# Patient Record
Sex: MALE | Race: WHITE | NOT HISPANIC OR LATINO | Employment: FULL TIME | URBAN - METROPOLITAN AREA
[De-identification: names, ages, dates, MRNs, and addresses within clinical notes are randomized per-mention and may not be internally consistent; named-entity substitution may affect disease eponyms.]

---

## 2017-04-11 ENCOUNTER — OFFICE VISIT (OUTPATIENT)
Dept: PODIATRY | Facility: CLINIC | Age: 32
End: 2017-04-11
Payer: COMMERCIAL

## 2017-04-11 VITALS
DIASTOLIC BLOOD PRESSURE: 89 MMHG | SYSTOLIC BLOOD PRESSURE: 140 MMHG | WEIGHT: 260 LBS | BODY MASS INDEX: 35.21 KG/M2 | HEIGHT: 72 IN | HEART RATE: 79 BPM

## 2017-04-11 DIAGNOSIS — L08.9 FOREIGN BODY IN FOOT, RIGHT, INFECTED, INITIAL ENCOUNTER: Primary | ICD-10-CM

## 2017-04-11 DIAGNOSIS — S90.851A FOREIGN BODY IN FOOT, RIGHT, INFECTED, INITIAL ENCOUNTER: Primary | ICD-10-CM

## 2017-04-11 PROCEDURE — 87070 CULTURE OTHR SPECIMN AEROBIC: CPT

## 2017-04-11 PROCEDURE — 28190 REMOVAL OF FOOT FOREIGN BODY: CPT | Mod: S$GLB,,, | Performed by: PODIATRIST

## 2017-04-11 PROCEDURE — 87075 CULTR BACTERIA EXCEPT BLOOD: CPT

## 2017-04-11 PROCEDURE — 99999 PR PBB SHADOW E&M-NEW PATIENT-LVL III: CPT | Mod: PBBFAC,,, | Performed by: PODIATRIST

## 2017-04-11 PROCEDURE — 1160F RVW MEDS BY RX/DR IN RCRD: CPT | Mod: S$GLB,,, | Performed by: PODIATRIST

## 2017-04-11 PROCEDURE — 99203 OFFICE O/P NEW LOW 30 MIN: CPT | Mod: 25,S$GLB,, | Performed by: PODIATRIST

## 2017-04-11 RX ORDER — LISINOPRIL 10 MG/1
10 TABLET ORAL DAILY
COMMUNITY

## 2017-04-11 RX ORDER — AMOXICILLIN AND CLAVULANATE POTASSIUM 875; 125 MG/1; MG/1
1 TABLET, FILM COATED ORAL EVERY 12 HOURS
Qty: 20 TABLET | Refills: 0 | Status: SHIPPED | OUTPATIENT
Start: 2017-04-03 | End: 2017-04-13

## 2017-04-11 NOTE — PROGRESS NOTES
Subjective:      Patient ID: Ceasar Argueta is a 31 y.o. male.    Chief Complaint: Foot Injury (R foot last Monday)    Ceasar is a 31 y.o. male who presents to the clinic for evaluation and treatment of a wound secondary to stepping on mulch on April 3. Ceasar has a past medical history of Hyperlipidemia and Hypertension. The patient's chief complaint is foot ulcer, R foot. He came to my house the evening he did it and I was able to flush it with sterile saline and remove superficial dirt and dress with Iodosorb Gel and a Bandaid and called in Augmentin BID. It got much better, but last night after work, it started bothering him and draining again, so I had him come in today for a check.     PCP: Primary Doctor No    Date Last Seen by PCP: He hasn't seen him yet. His old one was Dr. Santo Painting but he is switching to a new one.      Current shoe gear:  Affected Foot: Tennis shoes     Unaffected Foot: Tennis shoes    History of Trauma: positive  Sign of Infection: none    No results found for: HGBA1C    Past Medical History:   Diagnosis Date    Hyperlipidemia     Hypertension       Past Surgical History:   Procedure Laterality Date    ADENOIDECTOMY      TONSILLECTOMY        Social History     Social History    Marital status:      Spouse name: N/A    Number of children: N/A    Years of education: N/A     Social History Main Topics    Smoking status: Never Smoker    Smokeless tobacco: None    Alcohol use None    Drug use: None    Sexual activity: Not Asked     Other Topics Concern    None     Social History Narrative    None      Family History   Problem Relation Age of Onset    Hypertension Mother     Hypertension Father     Heart disease Maternal Grandfather     Heart disease Paternal Grandfather     COPD Paternal Grandfather         Review of Systems   Constitution: Negative for chills and fever.   Cardiovascular: Negative for chest pain.   Skin: Positive for itching (around the site  of the puncture wound on the foot). Negative for poor wound healing and rash.   Neurological: Negative for loss of balance and numbness.   Psychiatric/Behavioral: Negative for altered mental status.   Allergic/Immunologic: Negative for environmental allergies.     Vitals:    04/11/17 0728   BP: (!) 140/89   Pulse: 79   Weight: 117.9 kg (260 lb)   Height: 6' (1.829 m)   PainSc:   2           Objective:      Physical Exam   Constitutional: He is oriented to person, place, and time. He appears well-developed and well-nourished. No distress.   Cardiovascular: Normal rate and intact distal pulses.    Musculoskeletal: He exhibits no edema or tenderness.   Neurological: He is alert and oriented to person, place, and time.   Skin: Skin is warm and dry. No rash noted. He is not diaphoretic. No erythema. No pallor.   Nails in good repair.    Ulcer Location: R plantar arch area  Measurements: 0.4 x 0.2 x 0.8 cm with undermining distally of 1 cm.  Periwound: Intact  Drainage: Mild serosanguinous.  Pus: None.  Malodor: None.  Base:  Difficult to inspect the whole wound, but mostly appears granular wit ha couple of areas of black debris. Signs of infection: None.    Psychiatric: He has a normal mood and affect. His behavior is normal.   Nursing note and vitals reviewed.            Assessment:       Encounter Diagnosis   Name Primary?    Foreign body in foot, right, infected, initial encounter Yes   Infection seems to be resolving nicely, but wound with significant depth and must R/O further residual foreign body.       Plan:       Ceasar was seen today for foot injury.    Diagnoses and all orders for this visit:    Foreign body in foot, right, infected, initial encounter  -     Aerobic culture  -     Culture, Anaerobic  -     INCISION AND DRAINAGE  -     X-Ray Foot Complete Right; Future    Other orders  -     amoxicillin-clavulanate 875-125mg (AUGMENTIN) 875-125 mg per tablet; Take 1 tablet by mouth every 12 (twelve)  hours.    - Patient was given written and verbal instructions regarding foot condition.  I counseled the patient on his conditions, their implications and medical management.    - See operative report for wound drainage and foreign body excision.    - The wound is cleansed as much as possible and dressed with Iodosorb Gel and Mepilex Border. The patient is alerted to watch for any signs of infection (redness, pus, pain, increased swelling or fever) and call if such occurs. Home wound care instructions are provided.     Return in about 1 week (around 4/18/2017).

## 2017-04-11 NOTE — PROCEDURES
"Incision and Drainage  Date/Time: 4/11/2017 8:30 AM  Performed by: NITA MCCANN  Authorized by: NITA MCCANN     Time out: Immediately prior to procedure a "time out" was called to verify the correct patient, procedure, equipment, support staff and site/side marked as required.    Consent Done?:  Yes (Verbal)    Type:  Abscess (foreign object)  Body area:  Lower extremity  Location details:  Right foot  Anesthesia:  Local infiltration  Local anesthetic: lidocaine 2% without epinephrine  Anesthetic total (ml):  2  Scalpel size:  15  Complexity:  Simple  Drainage:  Serosanguinous  Drainage amount:  Scant  Wound treatment:  Incision, wound left open and expression of material (piece of mulch in tissue was removed)  Patient tolerance:  Patient tolerated the procedure well with no immediate complications    Area was flushed in three flushings of a total of 30 ml of saline and the wound was inspected and no further foreign objects were noted.       "

## 2017-04-11 NOTE — PATIENT INSTRUCTIONS
"1. DRESS YOUR WOUND DAILY AFTER CLEANSING WITH SALINE.    2. DRESS DAILY WITH DRESSINGS ORDERED BY DR. MCCANN OR ANTIBIOTIC OINTMENT SUCH AS POLYSPORIN OR BACITRACIN.    3. ALWAYS KEEP YOUR WOUND COVERED WITH A STERILE BANDAGE. DO NOT LET IT "AIR OUT."    4. WEAR SPECIAL SHOE, IF PRESCRIBED BY DR. MCCANN.     5. PLEASE CALL THE OFFICE IMMEDIATELY IF ANY SIGNS OF INFECTION SUCH AS FEVER, CHILLS, SWEATS, INCREASED REDNESS OR PAIN OR IF ANY NEW PROBLEMS DEVELOP.     6. THE OFFICE NUMBER -[phone]. AFTER HOURS, YOU CAN ASK THE [facility]  TO PAGE DR. MCCANN IN THE EVENT OF AN EMERGENCY [phone].   "

## 2017-04-12 ENCOUNTER — HOSPITAL ENCOUNTER (OUTPATIENT)
Dept: RADIOLOGY | Facility: HOSPITAL | Age: 32
Discharge: HOME | End: 2017-04-12
Attending: PODIATRIST
Payer: COMMERCIAL

## 2017-04-12 DIAGNOSIS — L08.9 FOREIGN BODY IN FOOT, RIGHT, INFECTED, INITIAL ENCOUNTER: ICD-10-CM

## 2017-04-12 DIAGNOSIS — S90.851A FOREIGN BODY IN FOOT, RIGHT, INFECTED, INITIAL ENCOUNTER: ICD-10-CM

## 2017-04-12 PROCEDURE — 73630 X-RAY EXAM OF FOOT: CPT | Mod: TC,PO,RT

## 2017-04-12 PROCEDURE — 73630 X-RAY EXAM OF FOOT: CPT | Mod: 26,RT,, | Performed by: RADIOLOGY

## 2017-04-13 ENCOUNTER — OFFICE VISIT (OUTPATIENT)
Dept: PODIATRY | Facility: CLINIC | Age: 32
End: 2017-04-13
Payer: COMMERCIAL

## 2017-04-13 ENCOUNTER — HOSPITAL ENCOUNTER (OUTPATIENT)
Dept: RADIOLOGY | Facility: HOSPITAL | Age: 32
Discharge: HOME | End: 2017-04-13
Attending: PODIATRIST
Payer: COMMERCIAL

## 2017-04-13 VITALS
TEMPERATURE: 98 F | DIASTOLIC BLOOD PRESSURE: 79 MMHG | HEIGHT: 72 IN | BODY MASS INDEX: 35.21 KG/M2 | WEIGHT: 260 LBS | HEART RATE: 94 BPM | SYSTOLIC BLOOD PRESSURE: 131 MMHG

## 2017-04-13 DIAGNOSIS — S91.301D OPEN WOUND OF FOOT EXCEPT TOES WITH COMPLICATION, RIGHT, SUBSEQUENT ENCOUNTER: Primary | ICD-10-CM

## 2017-04-13 DIAGNOSIS — S90.851D: ICD-10-CM

## 2017-04-13 DIAGNOSIS — L08.9: ICD-10-CM

## 2017-04-13 PROCEDURE — 99499 UNLISTED E&M SERVICE: CPT | Mod: S$GLB,,, | Performed by: PODIATRIST

## 2017-04-13 PROCEDURE — 99999 PR PBB SHADOW E&M-EST. PATIENT-LVL III: CPT | Mod: PBBFAC,,, | Performed by: PODIATRIST

## 2017-04-13 PROCEDURE — 73630 X-RAY EXAM OF FOOT: CPT | Mod: 26,RT,, | Performed by: RADIOLOGY

## 2017-04-13 PROCEDURE — 28001 DRAINAGE OF BURSA OF FOOT: CPT | Mod: 58,S$GLB,, | Performed by: PODIATRIST

## 2017-04-13 PROCEDURE — 73630 X-RAY EXAM OF FOOT: CPT | Mod: TC,RT

## 2017-04-13 RX ORDER — HYDROCODONE BITARTRATE AND ACETAMINOPHEN 5; 325 MG/1; MG/1
1 TABLET ORAL EVERY 4 HOURS PRN
Qty: 18 TABLET | Refills: 0 | Status: SHIPPED | OUTPATIENT
Start: 2017-04-13

## 2017-04-13 NOTE — PROGRESS NOTES
Subjective:      Patient ID: Ceasar Argueta is a 31 y.o. male.    Chief Complaint: Foot Injury    Patient presents for follow-up of foreign body/mulch in his R foot. He said the foot is still sore and has been draining off and on. The X-rays showed a questionable density, so he is here today for I and D with removal of any residual foreign body.     Review of Systems   Constitution: Negative for chills and fever.   Cardiovascular: Negative for chest pain.   Skin: Negative for poor wound healing and rash.   Neurological: Negative for loss of balance and numbness.   Psychiatric/Behavioral: Negative for altered mental status.   Allergic/Immunologic: Negative for environmental allergies.           Vitals:    04/13/17 1634 04/13/17 1635   BP: 131/79    Pulse: 94    Temp: 97.7 °F (36.5 °C)    Weight: 117.9 kg (260 lb)    Height: 6' (1.829 m)    PainSc:    4       Objective:      Physical Exam   Constitutional: He is oriented to person, place, and time. He appears well-developed and well-nourished. No distress.   Cardiovascular: Normal rate and intact distal pulses.    Musculoskeletal: He exhibits no edema or tenderness.   Neurological: He is alert and oriented to person, place, and time.   Skin: Skin is warm and dry. No rash noted. He is not diaphoretic. No erythema. No pallor.   Nails in good repair.    Ulcer Location: R plantar arch area  Measurements: 0.4 x 0.4 x 0.8 cm with undermining distally of 1 cm.  Periwound: Intact, but with ring of callus.  Drainage: Mild serosanguinous.  Pus: None.  Malodor: None.  Base:  Difficult to inspect the whole wound, but mostly appears granular.   Signs of infection: None.    Psychiatric: He has a normal mood and affect. His behavior is normal.   Nursing note and vitals reviewed.            Assessment:       Encounter Diagnosis   Name Primary?    Foreign body in foot, right, infected, subsequent encounter Yes   Open wound still tracking and draining.Wound is healing slowly  without signs of infection.        Plan:       Ceasar was seen today for foot injury.    Diagnoses and all orders for this visit:    Foreign body in foot, right, infected, subsequent encounter    - Patient was given written and verbal instructions regarding foot condition.  I counseled the patient on his conditions, their implications and medical management.      - See operative report for wound debridement.     - The wound is cleansed as much as possible and dressed with Steri=strips and Iodosorb Gel and Mepilex Border. The patient is alerted to watch for any signs of infection (redness, pus, pain, increased swelling or fever) and call if such occurs. Home wound care instructions are provided.     - With patient's permission, I performed the application of a football dressing to the affected R foot. Patient tolerated well. Darco shoe applied and patient instructed to never walk without the Darco shoe on the foot.      Return in about 1 week (around 4/20/2017).

## 2017-04-15 LAB — BACTERIA SPEC AEROBE CULT: NO GROWTH

## 2017-04-16 NOTE — PATIENT INSTRUCTIONS
1. LEAVE FOOT DRESSINGS DRY AND INTACT UNTIL MONDAY AND THEN YOUR MOTHER CAN REMOVE IT AND YOU CAN GENTLY CLEANSED IT WITH SOAP AND WATER AND PAT DRY AND REDRESS WITH IODOSORB GEL AND A STERILE BANDAGE.    2. ELEVATE AND REST FOOT AS MUCH AS POSSIBLE.    3. WEAR SURGICAL BOOT AT ALL TIMES.     4. CALL IMMEDIATELY IF ANY PROBLEMS SUCH AS THE BANDAGE GETTING WET OR FALLING OFF.    5. CALL IMMEDIATELY IF ANY SIGNS OF INFECTION SUCH AS FEVER, CHILLS, SWEATS, INCREASED REDNESS, OR PAIN.

## 2017-04-16 NOTE — PROCEDURES
"Incision and Drainage  Date/Time: 4/13/2017 6:20 PM  Performed by: NITA MCCANN  Authorized by: NITA MCCANN     Time out: Immediately prior to procedure a "time out" was called to verify the correct patient, procedure, equipment, support staff and site/side marked as required.    Consent Done?:  Yes (Written)    Indications for incision and drainage: tracking wound.  Body area:  Lower extremity  Location details:  Right foot  Anesthesia:  Local infiltration  Local anesthetic: lidocaine 2% without epinephrine and bupivacaine 0.5% without epinephrine  Anesthetic total (ml):  12 (3:1 lido:Marcaine)  Scalpel size:  15  Incision type:  Single straight  Complexity:  Simple  Drainage:  Serosanguinous (Wound was inspected carefully and no further foreign bodies were noted. )  Drainage amount:  Scant (Flushed with 50 ml of sterile saline)  Wound treatment:  Drainage, incision and expression of material (included removal of damaged tissue and callus)  Packing material: Skin margins were reapproximamted and mainitained using 4-0 Prolene in simplpe interrupted fashion.   Patient tolerance:  Patient tolerated the procedure well with no immediate complications      Note: Prior to sutring, patient was brought via wheelchair to X-ray where 4 films at different angles were taken and no further densities were noted.   "

## 2017-04-17 LAB — BACTERIA SPEC ANAEROBE CULT: NORMAL

## 2017-04-26 ENCOUNTER — OFFICE VISIT (OUTPATIENT)
Dept: PODIATRY | Facility: CLINIC | Age: 32
End: 2017-04-26
Payer: COMMERCIAL

## 2017-04-26 VITALS
DIASTOLIC BLOOD PRESSURE: 92 MMHG | HEART RATE: 96 BPM | WEIGHT: 260 LBS | BODY MASS INDEX: 35.21 KG/M2 | HEIGHT: 72 IN | SYSTOLIC BLOOD PRESSURE: 147 MMHG

## 2017-04-26 DIAGNOSIS — L08.9: Primary | ICD-10-CM

## 2017-04-26 DIAGNOSIS — S90.851D: Primary | ICD-10-CM

## 2017-04-26 DIAGNOSIS — S91.301D OPEN WOUND OF FOOT EXCEPT TOES WITH COMPLICATION, RIGHT, SUBSEQUENT ENCOUNTER: ICD-10-CM

## 2017-04-26 PROBLEM — S91.301A OPEN WOUND OF RIGHT FOOT EXCEPT TOES WITH COMPLICATION: Status: ACTIVE | Noted: 2017-04-26

## 2017-04-26 PROCEDURE — 99999 PR PBB SHADOW E&M-EST. PATIENT-LVL III: CPT | Mod: PBBFAC,,, | Performed by: PODIATRIST

## 2017-04-26 PROCEDURE — 1160F RVW MEDS BY RX/DR IN RCRD: CPT | Mod: S$GLB,,, | Performed by: PODIATRIST

## 2017-04-26 PROCEDURE — 99212 OFFICE O/P EST SF 10 MIN: CPT | Mod: S$GLB,,, | Performed by: PODIATRIST

## 2017-04-26 NOTE — PROGRESS NOTES
Subjective:      Patient ID: Ceasar Argueta is a 31 y.o. male.    Chief Complaint: Open wound of foot except toes with complication, right, sub    Pt. returns to the clinic 2 weeks S/P foot surgery. No c/o fever, chiils, sweats. No pain, as expected.       ROS  No fever, chills, sweats.  Vitals:    04/26/17 0721   BP: (!) 147/92   Pulse: 96   Weight: 117.9 kg (260 lb)   Height: 6' (1.829 m)   PainSc: 0-No pain   Just took his HTN meds.      Objective:      Physical Exam   Constitutional: He is oriented to person, place, and time. He appears well-developed and well-nourished. No distress.   Cardiovascular: Normal rate and intact distal pulses.    Musculoskeletal: He exhibits no edema or tenderness.   Neurological: He is alert and oriented to person, place, and time.   Skin: Skin is warm and dry. No rash noted. He is not diaphoretic. No erythema. No pallor.   Nails in good repair.    Sutures intact. No edema. No erythema. No acute signs of infection. No drainage on bandage.       Psychiatric: He has a normal mood and affect. His behavior is normal.   Nursing note and vitals reviewed.            Assessment:       Encounter Diagnoses   Name Primary?    Foreign body in foot, right, infected, subsequent encounter Yes    Open wound of foot except toes with complication, right, subsequent encounter     Wound well healed without signs of infection.       Plan:       Ceasar was seen today for open wound of foot except toes with complication, right, sub.    Diagnoses and all orders for this visit:    Foreign body in foot, right, infected, subsequent encounter    Open wound of foot except toes with complication, right, subsequent encounter    - Patient was given written and verbal instructions regarding foot condition.  I counseled the patient on his conditions, their implications and medical management.    - Sterile suture removal performed. Pt. is to return to the clinic p.r.n. Call if any problems.

## 2022-07-14 ENCOUNTER — CLINICAL SUPPORT (OUTPATIENT)
Dept: OTHER | Facility: CLINIC | Age: 37
End: 2022-07-14

## 2022-07-14 DIAGNOSIS — Z00.8 ENCOUNTER FOR OTHER GENERAL EXAMINATION: ICD-10-CM

## 2022-07-15 VITALS
DIASTOLIC BLOOD PRESSURE: 88 MMHG | HEIGHT: 72 IN | SYSTOLIC BLOOD PRESSURE: 132 MMHG | BODY MASS INDEX: 40.09 KG/M2 | WEIGHT: 296 LBS

## 2022-07-15 LAB
HDLC SERPL-MCNC: 30 MG/DL
POC CHOLESTEROL, LDL (DOCK): 172.3 MG/DL
POC CHOLESTEROL, TOTAL: 238 MG/DL
POC GLUCOSE, FASTING: 93 MG/DL (ref 60–110)
TRIGL SERPL-MCNC: 190 MG/DL

## 2023-03-03 ENCOUNTER — CLINICAL SUPPORT (OUTPATIENT)
Dept: OTHER | Facility: CLINIC | Age: 38
End: 2023-03-03

## 2023-03-03 DIAGNOSIS — Z00.8 ENCOUNTER FOR OTHER GENERAL EXAMINATION: ICD-10-CM

## 2023-03-04 VITALS
DIASTOLIC BLOOD PRESSURE: 100 MMHG | BODY MASS INDEX: 38.87 KG/M2 | SYSTOLIC BLOOD PRESSURE: 152 MMHG | WEIGHT: 287 LBS | HEIGHT: 72 IN

## 2023-03-04 LAB
HDLC SERPL-MCNC: 21 MG/DL
POC CHOLESTEROL, LDL (DOCK): 160 MG/DL
POC CHOLESTEROL, TOTAL: 221 MG/DL
POC GLUCOSE, FASTING: 98 MG/DL (ref 60–110)
TRIGL SERPL-MCNC: 212 MG/DL

## 2024-01-25 ENCOUNTER — CLINICAL SUPPORT (OUTPATIENT)
Dept: OTHER | Facility: CLINIC | Age: 39
End: 2024-01-25

## 2024-01-25 DIAGNOSIS — Z00.8 ENCOUNTER FOR OTHER GENERAL EXAMINATION: ICD-10-CM

## 2024-01-26 VITALS
BODY MASS INDEX: 39.28 KG/M2 | WEIGHT: 290 LBS | SYSTOLIC BLOOD PRESSURE: 145 MMHG | HEIGHT: 72 IN | DIASTOLIC BLOOD PRESSURE: 94 MMHG

## 2024-01-26 LAB
HDLC SERPL-MCNC: 25 MG/DL
POC CHOLESTEROL, LDL (DOCK): 178 MG/DL
POC CHOLESTEROL, TOTAL: 242 MG/DL
POC GLUCOSE, FASTING: 96 MG/DL (ref 60–110)
TRIGL SERPL-MCNC: 205 MG/DL

## 2025-02-13 ENCOUNTER — CLINICAL SUPPORT (OUTPATIENT)
Dept: OTHER | Facility: CLINIC | Age: 40
End: 2025-02-13
Payer: COMMERCIAL

## 2025-02-13 DIAGNOSIS — Z00.8 ENCOUNTER FOR OTHER GENERAL EXAMINATION: ICD-10-CM

## 2025-02-14 ENCOUNTER — TELEPHONE (OUTPATIENT)
Dept: OTHER | Facility: CLINIC | Age: 40
End: 2025-02-14
Payer: COMMERCIAL

## 2025-02-14 VITALS
SYSTOLIC BLOOD PRESSURE: 174 MMHG | HEIGHT: 70 IN | WEIGHT: 301 LBS | BODY MASS INDEX: 43.09 KG/M2 | DIASTOLIC BLOOD PRESSURE: 99 MMHG

## 2025-02-14 LAB
HDLC SERPL-MCNC: 26 MG/DL
POC CHOLESTEROL, LDL (DOCK): 184 MG/DL
POC CHOLESTEROL, TOTAL: 238 MG/DL
POC GLUCOSE, FASTING: 98 MG/DL (ref 60–110)
TRIGL SERPL-MCNC: 149 MG/DL

## 2025-02-14 NOTE — TELEPHONE ENCOUNTER
Attempted to contact patient on behalf of [employer] to discuss BP results.. No answer. Left message.  Encouraged FU with PCP.  
Yes